# Patient Record
(demographics unavailable — no encounter records)

---

## 2024-12-12 NOTE — HISTORY OF PRESENT ILLNESS
[de-identified] : - 65 yo female who presents with dysphonia. Pt states that his has been a long standing issue. She states that her voice is normal when she wakes up in the morning. As soon as she steps outside or smell a fragrance her voice will become hoarse. She will have difficulty with projection and pitch control. This will happen throughout the day and then return to normal during her walk home. No issues chewing, eating or swallowing. She is on an anti reflux diet and follows this due to a hx of gastric ulcers. She drinks 8 glasses of water per day. No issues breathing with exertion. Does have a questionable hx of asthma vs COPD. She is a former smoker. +stress from work. No fever, night sweats. She states that she has had weight loss over the past 3 years. No ENT issues otherwise. - Update 6/26/20 Overall stable and well. Pt presents for pre surgical visit. Overall she notes that her dysphonia is more persistent throughout the day, and no longer normal in the morning or tied to her environment. Otherwise no changes in history. She saw her PCP who cleared her or surgery. No new ENT issues. -  Interval History: Update 7/6/20 Pt is POD#7 s/p suspension microdirect laryngoscopy with excisional biopsy of left vocal fold lesion. Overall pt is stable and doing well. She denies any significant pain. No issues chewing, eating or swallowing. No breathing issues. She notes improvement in her voice for 1-2 days followed by dysphonia. No new ENT issues otherwise.  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  Note: p40 and Me49-EDB support diagnosis. In accordance with departmental policy, this case was reviewed with a second pathologist in the department.  -  Update 10/6/20 Patient is now s/p completion of radiation 9/15. Patient reports fatigue, swollen throat and hoarseness. She reports improved swallowing and good hydration (drinking about 1 gallon of water daily). No fevers, night sweats or weight loss. No referred otalgia. No new ENT issues otherwise. -  Interval History: Update 1/19/21 Pt is doing great overall. Voice is clear and functional. She is eating well and adding weight, though notes some taste disturbance. She does feel fatigued and unmotivated to workout (treadmill in apt). No fevers, night sweats or weight loss. No referred otalgia. No new ENT issues otherwise.  *SpP ordered at last visit but patient chose not to go.  - Update 3/30/21 Patient is overall stable. She continues to report hyposmia and loss of taste. She also has some dysphonia with overuse. No fevers, night sweats, weight loss (pt is gaining weight), or referred otalgia. She denies any new ENT complaints. - Update 6/8/21 Patient is overall stable. Voice has been stable. No fevers, night sweats, weight loss (pt is gaining weight), or referred otalgia. She denies any new ENT complaints.   She still is not going outside and states this is related to laziness and not anxiety. She did have her CT neck completed and presents with the results today. - Update 9/21/21 Overall stable and well. Voice has been good. Pt has been gaining weight. No referred otalgia. Today she did mention some episodic sensation of throat closing that lasts seconds, and can occur a few times per week. Typically resolves on own. Similarly she will have episodes where she feels that her swallow is not working. This happens momentarily and not while eating. -  Update 12/7/21 Overall stable and well. Voice has been good, essentially unchanged from previous. She will note dysphonia with stress, exertion or exposure to strong odors. Pt has been gaining weight and tolerating a normal diet without issue. Still eating junk food. No referred otalgia, fevers, night sweats, weight loss, new overt neck masses.  - Update 3/24/22 Pt is overall stable and doing well. Voice has been functional, unchanged from previous. Denies fevers, night sweats, weight loss, or referred otalgia. No new overt neck masses. Denies any other ENT issues at this time. Some intermittent nasal congestion and epistaxis. Still not leaving the apartment and working from home, mostly due to concern for chemical inhalation. Eats diet of boiled chicken, kale and quinoa nightly. Feels acid reflux when changes her diet, otherwise no changes. - Update 7/25/22 Pt is overall stable and doing well. Voice has been functional, unchanged from previous. Denies fevers, night sweats (feels sleeps hot), weight loss, or referred otalgia. No new overt neck masses (feels a few bumps, not sure if there previously). Very intermittently dysphagia when drinking water, at same time as bits of solid/dry foods. Denies any other ENT issues at this time. Still not leaving the apartment and working from home, mostly due to concern for chemical inhalation. Eats diet of boiled chicken, kale and quinoa nightly. Feels acid reflux when changes her diet (worse lately), otherwise no changes. - 11/8/22 Pt is overall stable and doing well. Voice has been functional, unchanged from previous. Denies fevers, night sweats (feels sleeps hot), weight loss, or referred otalgia. No new overt neck masses. Patient now complains of dysphagia and feeling that food can get stuck at times in her throat. She does remain on a very bland diet as to not cause reflux. She has not tried any reflux medications in the past. Weight has been stable. No new ear, nose, throat symptoms otherwise. -  Interval History: 12/8/2022  no significant change in symptoms. No new ear, nose, throat symptoms. Patient did complete modified barium swallow for evaluation of dysphagia which she has been experiencing as of late. She presents today to review those results.  Of note today the patient let me know that there was concern for some spots on her lungs as well as colon and she was ultimately given a PET/CT which did show some markers with recommendation for follow-up for repeat evaluation with CT chest after 3 months. This is pending in the next few weeks. She also notes that on the PET/CT the larynx was negative for uptake.  - 6/1/23 No changes in symptoms. Voice has been functional. No fevers, night sweats, weight loss or referred otalgia. no new neck masses. No new ENT issues otherwise.   Has been dealing with MAC infection and following with pulm for this - 11/13/23 No changes in symptoms. Voice has been functional. No fevers, night sweats, weight loss or referred otalgia. no new neck masses. No new ENT issues otherwise.  Following up in the springtime regarding MAC infection. Patient also with concern for some blood-tinged mucus from the right nare recently. - 4/18/24 No changes in symptoms. Voice has been functional. No fevers, night sweats, weight loss or referred otalgia. no new neck masses. No new ENT issues otherwise. Following up in the springtime regarding MAC infection. - 8/27/24 No changes in symptoms. Voice has been functional. No fevers, night sweats, weight loss or referred otalgia. no new neck masses. No new ENT issues otherwise. Pill dysphagia but not getting stuck anymore, maintaining adequate nutrition. Last MBS 12/2022.  - [FreeTextEntry1] : 12/12/24: Here for routine surveillance visit.  No changes in symptoms. Voice has been functional. No fevers, night sweats, weight loss or referred otalgia. no new neck masses. No new ENT issues otherwise.

## 2024-12-12 NOTE — PHYSICAL EXAM
[Normal] : mucosa is normal [Midline] : trachea located in midline position [Laryngoscopy Performed] : laryngoscopy was performed, see procedure section for findings [de-identified] : post radiation changes

## 2024-12-12 NOTE — ASSESSMENT
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 Overall the patient is stable and doing well. No evidence of tumors, masses or lesions. Very mild asymmetry of vocal fold wave on stroboscopy, reduced on the left, with some evidence of whitish scarring/keratosis/post XRT changes to the left posterior vocal fold. This has remained unchanged over the past four months. Follow up in 4 months. She knows to follow up sooner should there be any changes.   In terms of reflux I do believe that there may be some component of LPR. At this time I am recommending omeprazole to supplement her strict dietary changes. We also briefly discussed the use of Gaviscon-alginate. Hopefully with this medication she can somewhat broaden her diet and help put on weight. In terms of her dysphagia I am recommending a modified barium swallow and consultation with speech pathology for dysphagia therapy. We will help schedule that in between her next surveillance visit.   Modified barium swallow was completed and was essentially normal. Patient was advised to continue with voice and foods and then alternate solids and liquids. We discussed advancing diet however patient again brings up both potential for reflux as well as COPD exacerbation limiting her diet. We will discuss this further after her upcoming chest evaluations and CT examination. Patient will otherwise follow-up on routine surveillance schedule.  4/18/24: Patient presents for routine surveillance. No new symptoms. No changes in symptoms otherwise. Exam unchanged. fu in 4 mo for repeat eval. Understands to follow with pulm for MAC infection.   8/27/24: Here for routine surveillance. No new symptoms. No changes in symptoms otherwise. Stroboscopy stable with perhaps continued improvement in the area of parakeratosis on the left TVF. There was thick mucus in the endolarynx that she was able to clear. For this, I emphasized importance of increasing hydration. Also describing mild pill dysphagia, maintaining adequate nutrition. Last MBS 12/2022. If persisting, will refer back to SLP for swallow therapy but will hold off for now. Exam unchanged. fu in 4 mo for repeat eval.  12/12/24: Patient presents for routine surveillance. No new symptoms. No changes in symptoms otherwise. Exam unchanged. fu in 4 mo for repeat eval.   Plan: - f/u in 6 mo, sooner if new or worsening issues. - voice hygiene - cont weight gain - fu with pulm for MAC infection

## 2024-12-12 NOTE — PROCEDURE
[Video Captured] : video captured and filed [de-identified] : - Laryngoscopy: Flexible Laryngoscopy with Stroboscopy  Procedure Note   Pre-operative Diagnosis: hx left vocal fold SCCa s/p XRT Post-operative Diagnosis: Mild post radiation and surgical change. Overall well healed endolarynx without evidence of lesion or recurrence. Mild asymmetric vocal fold wave, evidence of mild scar/parakeratosis on the left posterior vocal fold, unchanged from previous + mucus sitting on endolarynx able to clear  Anesthesia: Topical - 1 % Lidocaine/Phenylephrine Procedure: Flexible Laryngoscopy with Stroboscopy Procedure Details:  The patient was placed in the sitting position. After decongestant and anesthesia were applied the laryngoscope was passed. The nasal cavities, nasopharynx, oropharynx, hypopharynx, and larynx were all examined. Vocal folds were examined during respiration and phonation. The following findings were noted:   Findings:  Nose: Septum is midline, turbinates are normal, nasal airways patent, mucosa normal Nasopharynx: Adenoids normal, no masses, eustachian tube normal Oropharynx: Pharyngeal walls symmetric and without lesion. Tonsils/fossae symmetric Hypopharynx: Hypopharynx and pyriform sinuses without lesion. No masses or asymmetry. No pooling of secretions. Larynx: Epiglottis and aryepiglottic folds were sharp and crisp bilaterally. Bilateral false vocal folds normal appearance. Airway was widely patent.   I-scan - evidence of whitish scar/parakeratosis/post XRT change of the left vocal posterior vocal fold, unchanged from previous; edges smoothed out more from prior  Strobe Exam Ratings TVF Appearance: healthy appearing bilaterally, with mild post XRT surgical change on left posterior fold without evidence of recurrence. there is evidence of some scarring or possible keratosis and ectasia. Improvement in fullness on the LEFT.  TVF Mobility: normal Edema/hypertrophy: none Mucus on TVF: normal Glottic Closure: normal Mucosal Wave: reduced on the left Amplitude of Vibration: reduced on the left Phase: mild asymmetry Supraglottic Hyperfunction: minimal Other Findings: none   Condition: Stable. Patient tolerated procedure well. Complications: None.

## 2025-05-19 NOTE — PROCEDURE
[Video Captured] : video captured and filed [de-identified] : - Laryngoscopy: Flexible Laryngoscopy with Stroboscopy  Procedure Note   Pre-operative Diagnosis: hx left vocal fold SCCa s/p XRT Post-operative Diagnosis: Mild post radiation and surgical change. Overall well healed endolarynx without evidence of lesion or recurrence. Mild asymmetric vocal fold wave, evidence of mild scar/parakeratosis on the left posterior vocal fold, unchanged from previous + mucus sitting on endolarynx able to clear  Anesthesia: Topical - 1 % Lidocaine/Phenylephrine Procedure: Flexible Laryngoscopy with Stroboscopy Procedure Details:  The patient was placed in the sitting position. After decongestant and anesthesia were applied the laryngoscope was passed. The nasal cavities, nasopharynx, oropharynx, hypopharynx, and larynx were all examined. Vocal folds were examined during respiration and phonation. The following findings were noted:   Findings:  Nose: Septum is midline, turbinates are normal, nasal airways patent, mucosa normal Nasopharynx: Adenoids normal, no masses, eustachian tube normal Oropharynx: Pharyngeal walls symmetric and without lesion. Tonsils/fossae symmetric Hypopharynx: Hypopharynx and pyriform sinuses without lesion. No masses or asymmetry. No pooling of secretions. Larynx: Epiglottis and aryepiglottic folds were sharp and crisp bilaterally. Bilateral false vocal folds normal appearance. Airway was widely patent.   I-scan - evidence of whitish scar/parakeratosis/post XRT change of the left vocal posterior vocal fold, unchanged from previous; edges smoothed out more from prior  Strobe Exam Ratings TVF Appearance: healthy appearing bilaterally, with mild post XRT surgical change on left posterior fold without evidence of recurrence. there is evidence of some scarring or possible keratosis and ectasia. Improvement in fullness on the LEFT.  TVF Mobility: normal Edema/hypertrophy: none Mucus on TVF: normal Glottic Closure: normal Mucosal Wave: reduced on the left Amplitude of Vibration: reduced on the left Phase: mild asymmetry Supraglottic Hyperfunction: minimal Other Findings: none   Condition: Stable. Patient tolerated procedure well. Complications: None.

## 2025-05-19 NOTE — ASSESSMENT
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 Overall the patient is stable and doing well. No evidence of tumors, masses or lesions. Very mild asymmetry of vocal fold wave on stroboscopy, reduced on the left, with some evidence of whitish scarring/keratosis/post XRT changes to the left posterior vocal fold. This has remained unchanged over the past four months. Follow up in 4 months. She knows to follow up sooner should there be any changes.   In terms of reflux I do believe that there may be some component of LPR. At this time I am recommending omeprazole to supplement her strict dietary changes. We also briefly discussed the use of Gaviscon-alginate. Hopefully with this medication she can somewhat broaden her diet and help put on weight. In terms of her dysphagia I am recommending a modified barium swallow and consultation with speech pathology for dysphagia therapy. We will help schedule that in between her next surveillance visit.   Modified barium swallow was completed and was essentially normal. Patient was advised to continue with voice and foods and then alternate solids and liquids. We discussed advancing diet however patient again brings up both potential for reflux as well as COPD exacerbation limiting her diet. We will discuss this further after her upcoming chest evaluations and CT examination. Patient will otherwise follow-up on routine surveillance schedule.  4/18/24: Patient presents for routine surveillance. No new symptoms. No changes in symptoms otherwise. Exam unchanged. fu in 4 mo for repeat eval. Understands to follow with pulm for MAC infection.   8/27/24: Here for routine surveillance. No new symptoms. No changes in symptoms otherwise. Stroboscopy stable with perhaps continued improvement in the area of parakeratosis on the left TVF. There was thick mucus in the endolarynx that she was able to clear. For this, I emphasized importance of increasing hydration. Also describing mild pill dysphagia, maintaining adequate nutrition. Last MBS 12/2022. If persisting, will refer back to SLP for swallow therapy but will hold off for now. Exam unchanged. fu in 4 mo for repeat eval.  12/12/24: Patient presents for routine surveillance. No new symptoms. No changes in symptoms otherwise. Exam unchanged. fu in 4 mo for repeat eval.   5/19/25: Patient presents for routine surveillance. No new symptoms. No changes in symptoms otherwise. Exam unchanged. fu in 6 mo for repeat eval.    Of note in terms of swallowing issues I did recommend seeing her speech pathology team, to discuss potential exercises Dianna after radiation and to improve swallowing.  I also recommended consultation with our SLP team to talk about laryngeal sensitivity as well.  Plan: - f/u in 6 mo, sooner if new or worsening issues. - voice hygiene - cont weight gain - fu with pulm for MAC infection - SLP consultation

## 2025-05-19 NOTE — HISTORY OF PRESENT ILLNESS
[de-identified] : - 63 yo female who presents with dysphonia. Pt states that his has been a long standing issue. She states that her voice is normal when she wakes up in the morning. As soon as she steps outside or smell a fragrance her voice will become hoarse. She will have difficulty with projection and pitch control. This will happen throughout the day and then return to normal during her walk home. No issues chewing, eating or swallowing. She is on an anti reflux diet and follows this due to a hx of gastric ulcers. She drinks 8 glasses of water per day. No issues breathing with exertion. Does have a questionable hx of asthma vs COPD. She is a former smoker. +stress from work. No fever, night sweats. She states that she has had weight loss over the past 3 years. No ENT issues otherwise. - Update 6/26/20 Overall stable and well. Pt presents for pre surgical visit. Overall she notes that her dysphonia is more persistent throughout the day, and no longer normal in the morning or tied to her environment. Otherwise no changes in history. She saw her PCP who cleared her or surgery. No new ENT issues. -  Interval History: Update 7/6/20 Pt is POD#7 s/p suspension microdirect laryngoscopy with excisional biopsy of left vocal fold lesion. Overall pt is stable and doing well. She denies any significant pain. No issues chewing, eating or swallowing. No breathing issues. She notes improvement in her voice for 1-2 days followed by dysphonia. No new ENT issues otherwise.  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 Note: p40 and [id number]-LZL support diagnosis. In accordance with departmental policy, this case was reviewed with a second pathologist in the department.  -  Update 10/6/20 Patient is now s/p completion of radiation 9/15. Patient reports fatigue, swollen throat and hoarseness. She reports improved swallowing and good hydration (drinking about 1 gallon of water daily). No fevers, night sweats or weight loss. No referred otalgia. No new ENT issues otherwise. -  Interval History: Update 1/19/21 Pt is doing great overall. Voice is clear and functional. She is eating well and adding weight, though notes some taste disturbance. She does feel fatigued and unmotivated to workout (treadmill in apt). No fevers, night sweats or weight loss. No referred otalgia. No new ENT issues otherwise.  *SpP ordered at last visit but patient chose not to go.  - Update 3/30/21 Patient is overall stable. She continues to report hyposmia and loss of taste. She also has some dysphonia with overuse. No fevers, night sweats, weight loss (pt is gaining weight), or referred otalgia. She denies any new ENT complaints. - Update 6/8/21 Patient is overall stable. Voice has been stable. No fevers, night sweats, weight loss (pt is gaining weight), or referred otalgia. She denies any new ENT complaints.   She still is not going outside and states this is related to laziness and not anxiety. She did have her CT neck completed and presents with the results today. - Update 9/21/21 Overall stable and well. Voice has been good. Pt has been gaining weight. No referred otalgia. Today she did mention some episodic sensation of throat closing that lasts seconds, and can occur a few times per week. Typically resolves on own. Similarly she will have episodes where she feels that her swallow is not working. This happens momentarily and not while eating. -  Update 12/7/21 Overall stable and well. Voice has been good, essentially unchanged from previous. She will note dysphonia with stress, exertion or exposure to strong odors. Pt has been gaining weight and tolerating a normal diet without issue. Still eating junk food. No referred otalgia, fevers, night sweats, weight loss, new overt neck masses.  - Update 3/24/22 Pt is overall stable and doing well. Voice has been functional, unchanged from previous. Denies fevers, night sweats, weight loss, or referred otalgia. No new overt neck masses. Denies any other ENT issues at this time. Some intermittent nasal congestion and epistaxis. Still not leaving the apartment and working from home, mostly due to concern for chemical inhalation. Eats diet of boiled chicken, kale and quinoa nightly. Feels acid reflux when changes her diet, otherwise no changes. - Update 7/25/22 Pt is overall stable and doing well. Voice has been functional, unchanged from previous. Denies fevers, night sweats (feels sleeps hot), weight loss, or referred otalgia. No new overt neck masses (feels a few bumps, not sure if there previously). Very intermittently dysphagia when drinking water, at same time as bits of solid/dry foods. Denies any other ENT issues at this time. Still not leaving the apartment and working from home, mostly due to concern for chemical inhalation. Eats diet of boiled chicken, kale and quinoa nightly. Feels acid reflux when changes her diet (worse lately), otherwise no changes. - 11/8/22 Pt is overall stable and doing well. Voice has been functional, unchanged from previous. Denies fevers, night sweats (feels sleeps hot), weight loss, or referred otalgia. No new overt neck masses. Patient now complains of dysphagia and feeling that food can get stuck at times in her throat. She does remain on a very bland diet as to not cause reflux. She has not tried any reflux medications in the past. Weight has been stable. No new ear, nose, throat symptoms otherwise. -  Interval History: 12/8/2022  no significant change in symptoms. No new ear, nose, throat symptoms. Patient did complete modified barium swallow for evaluation of dysphagia which she has been experiencing as of late. She presents today to review those results.  Of note today the patient let me know that there was concern for some spots on her lungs as well as colon and she was ultimately given a PET/CT which did show some markers with recommendation for follow-up for repeat evaluation with CT chest after 3 months. This is pending in the next few weeks. She also notes that on the PET/CT the larynx was negative for uptake.  - 6/1/23 No changes in symptoms. Voice has been functional. No fevers, night sweats, weight loss or referred otalgia. no new neck masses. No new ENT issues otherwise.   Has been dealing with MAC infection and following with pulm for this - 11/13/23 No changes in symptoms. Voice has been functional. No fevers, night sweats, weight loss or referred otalgia. no new neck masses. No new ENT issues otherwise.  Following up in the springtime regarding MAC infection. Patient also with concern for some blood-tinged mucus from the right nare recently. - 4/18/24 No changes in symptoms. Voice has been functional. No fevers, night sweats, weight loss or referred otalgia. no new neck masses. No new ENT issues otherwise. Following up in the springtime regarding MAC infection. - 8/27/24 No changes in symptoms. Voice has been functional. No fevers, night sweats, weight loss or referred otalgia. no new neck masses. No new ENT issues otherwise. Pill dysphagia but not getting stuck anymore, maintaining adequate nutrition. Last MBS 12/2022.  - 12/12/24: Here for routine surveillance visit.  No changes in symptoms. Voice has been functional. No fevers, night sweats, weight loss or referred otalgia. No new neck masses. No new ENT issues otherwise.  - [FreeTextEntry1] : 5/19/25 Here for routine surveillance visit.  No changes in symptoms. Voice has been functional. No fevers, night sweats, weight loss or referred otalgia. No new neck masses. No new ENT issues otherwise.  Patient again today brings up concern for dysphagia especially pill dysphagia.  She is able to eat she is able to get nutrition but she has to keep her head straight forward.  Has previously been seen by our speech pathology team of note patient still with concern for dyspnea and COPD which is exacerbated by strong chemical smells and cleaning products.  She has not had formal evaluation or workup for laryngeal sensitivity.

## 2025-05-19 NOTE — PHYSICAL EXAM
[Normal] : mucosa is normal [Midline] : trachea located in midline position [Laryngoscopy Performed] : laryngoscopy was performed, see procedure section for findings [de-identified] : post radiation changes